# Patient Record
Sex: FEMALE | Race: WHITE | NOT HISPANIC OR LATINO | URBAN - METROPOLITAN AREA
[De-identification: names, ages, dates, MRNs, and addresses within clinical notes are randomized per-mention and may not be internally consistent; named-entity substitution may affect disease eponyms.]

---

## 2018-04-24 ENCOUNTER — OFFICE VISIT (OUTPATIENT)
Dept: PSYCHIATRY | Facility: CLINIC | Age: 28
End: 2018-04-24
Payer: OTHER GOVERNMENT

## 2018-04-24 VITALS
HEART RATE: 85 BPM | WEIGHT: 145.81 LBS | HEIGHT: 69 IN | DIASTOLIC BLOOD PRESSURE: 66 MMHG | SYSTOLIC BLOOD PRESSURE: 116 MMHG | BODY MASS INDEX: 21.6 KG/M2

## 2018-04-24 DIAGNOSIS — F60.3 BORDERLINE PERSONALITY DISORDER IN ADULT: ICD-10-CM

## 2018-04-24 DIAGNOSIS — F41.1 GAD (GENERALIZED ANXIETY DISORDER): ICD-10-CM

## 2018-04-24 DIAGNOSIS — F40.10 SOCIAL ANXIETY DISORDER: ICD-10-CM

## 2018-04-24 DIAGNOSIS — F41.0 PANIC DISORDER: Primary | ICD-10-CM

## 2018-04-24 DIAGNOSIS — F43.10 PTSD (POST-TRAUMATIC STRESS DISORDER): ICD-10-CM

## 2018-04-24 PROCEDURE — 99203 OFFICE O/P NEW LOW 30 MIN: CPT | Mod: S$PBB,,, | Performed by: PSYCHIATRY & NEUROLOGY

## 2018-04-24 PROCEDURE — 99202 OFFICE O/P NEW SF 15 MIN: CPT | Mod: PBBFAC | Performed by: PSYCHIATRY & NEUROLOGY

## 2018-04-24 PROCEDURE — 99999 PR PBB SHADOW E&M-NEW PATIENT-LVL II: CPT | Mod: PBBFAC,,, | Performed by: PSYCHIATRY & NEUROLOGY

## 2018-04-24 RX ORDER — CLONAZEPAM 1 MG/1
1 TABLET ORAL DAILY PRN
Qty: 30 TABLET | Refills: 0 | Status: SHIPPED | OUTPATIENT
Start: 2018-04-24 | End: 2018-06-06 | Stop reason: SDUPTHER
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
## 2018-06-06 ENCOUNTER — OFFICE VISIT (OUTPATIENT)
Dept: PSYCHIATRY | Facility: CLINIC | Age: 28
End: 2018-06-06
Payer: OTHER GOVERNMENT

## 2018-06-06 VITALS
DIASTOLIC BLOOD PRESSURE: 61 MMHG | HEART RATE: 78 BPM | SYSTOLIC BLOOD PRESSURE: 114 MMHG | BODY MASS INDEX: 21.8 KG/M2 | WEIGHT: 147.19 LBS | HEIGHT: 69 IN

## 2018-06-06 DIAGNOSIS — F33.41 RECURRENT MAJOR DEPRESSIVE DISORDER, IN PARTIAL REMISSION: ICD-10-CM

## 2018-06-06 DIAGNOSIS — F50.81 BINGE-EATING DISORDER, MODERATE: Primary | ICD-10-CM

## 2018-06-06 DIAGNOSIS — F41.0 PANIC DISORDER: ICD-10-CM

## 2018-06-06 PROBLEM — F50.811 BINGE-EATING DISORDER, MODERATE: Status: ACTIVE | Noted: 2018-06-06

## 2018-06-06 PROCEDURE — 99213 OFFICE O/P EST LOW 20 MIN: CPT | Mod: S$PBB,,, | Performed by: PSYCHIATRY & NEUROLOGY

## 2018-06-06 PROCEDURE — 99213 OFFICE O/P EST LOW 20 MIN: CPT | Mod: PBBFAC | Performed by: PSYCHIATRY & NEUROLOGY

## 2018-06-06 PROCEDURE — 99999 PR PBB SHADOW E&M-EST. PATIENT-LVL III: CPT | Mod: PBBFAC,,, | Performed by: PSYCHIATRY & NEUROLOGY

## 2018-06-06 RX ORDER — LISDEXAMFETAMINE DIMESYLATE 30 MG/1
30 CAPSULE ORAL EVERY MORNING
Qty: 30 CAPSULE | Refills: 0 | Status: SHIPPED | OUTPATIENT
Start: 2018-08-06

## 2018-06-06 RX ORDER — BUPROPION HYDROCHLORIDE 300 MG/1
300 TABLET ORAL DAILY
Qty: 30 TABLET | Refills: 3 | Status: SHIPPED | OUTPATIENT
Start: 2018-06-06 | End: 2019-06-06

## 2018-06-06 RX ORDER — LISDEXAMFETAMINE DIMESYLATE 30 MG/1
30 CAPSULE ORAL EVERY MORNING
Qty: 30 CAPSULE | Refills: 0 | Status: SHIPPED | OUTPATIENT
Start: 2018-07-06 | End: 2018-06-06 | Stop reason: SDUPTHER

## 2018-06-06 RX ORDER — CLONAZEPAM 1 MG/1
1 TABLET ORAL DAILY PRN
Qty: 30 TABLET | Refills: 0 | Status: SHIPPED | OUTPATIENT
Start: 2018-06-06 | End: 2019-06-06

## 2018-06-06 RX ORDER — LISDEXAMFETAMINE DIMESYLATE 30 MG/1
30 CAPSULE ORAL EVERY MORNING
Qty: 30 CAPSULE | Refills: 0 | Status: SHIPPED | OUTPATIENT
Start: 2018-06-06 | End: 2018-06-06 | Stop reason: SDUPTHER

## 2018-06-06 NOTE — PROGRESS NOTES
06/06/2018  10:34 AM  iVdya Bah  [id number]          Psychiatry Clinic Note    SUBJECTIVE  Vidya Bah is a 28 y.o. old female who presents to clinic today for follow up of Depression, Adjustment disorder with anxiety.  She reports that since the last visit she has felt better with her mood now that she has seen acceptance from her significant other's family. She recently returned from their trip, and she reports that it went well. She reports that she is sleeping well and overall feels better. She mentions today that she is very concerned about her increases in binge-eating. She reports that she often eats in secret, eats until uncomfortably full, eats more than other people in the same situation, and she feels a complete loss of control when presented with food. She inquires about contrave to address her symptoms. After discussing at length, she does have both a component of craving large amounts of food, but more worrisome is her lack of control during these episodes. She has ahistory of purging behavior in the past, and she is worried that she may return to doing so if she can't get her symptoms under control. She is significantly distressed by her symptoms. Discussed trial of vyvanse for binge eating disorder-moderate, as this is approximately 5 or more times per week and increasing. Given that she has suffered with low-energy for years, the stimulant medication may help to bolster her mood at the same time. Contrave may be an option in the future if she fails trial of vyvanse; however given that she has been stable on her current dose of wellbutrin, she may benefit further from addition of naltrexone to her wellbutrin regimen. She denies SI. She reports that she and her boyfriend are moving later this month, and she is on a waitlist to see a psychiatrist up in [facility].     PSYCHIATRIC ROS  Denies: delusions, paranoia,  periods of persistently elevated/expansive or irritable mood and/or  "increased goal directed behavior.    Issues/problems with:   Sleep no  Appetite changes yes  Low energy yes  Poor concentration no  Psychomotor agitation no  Suicidal ideation no  Depressed mood no  Anhedonia no  Anxiety no  Worry yes, paticularly about binge episodes   Fear no    CURRENT HOME PSYCHIATRIC MEDICATIONS: Wellbutrin XL 300mg Daily. Klonopin 1mg Daily PRN severe anxiety, patient still has several pills left from previous visit.    Patient reports that She is tolerating medications as prescribed without any adverse side effects.         MEDICAL ROS  General ROS: negative for - chills or fever  ENT ROS: negative for - headaches or visual changes  Cardiovascular ROS: negative for - chest pain or palpitations  Respiratory ROS: negative for - cough or shortness of breath  Gastrointestinal ROS: negative for - nausea/vomiting  Neurological ROS: negative for - confusion or dizziness  Dermatological ROS: negative for rash and skin lesion changes  Endocrine ROS: negative for - mood swings or temperature intolerance      OBJECTIVE    Vitals:    06/06/18 0942   BP: 114/61   Pulse: 78       MENTAL STATUS EXAM  Appearance: no apparent distress, casually dressed, more relaxed appearing today  Behavior/Cooperation/Attitude: calm, cooperative, engaged  Speech: conversational rate/tone/volume  Mood: "better"  Affect: appropriate, but still mildly blunted  Thought Process: linear  Thought Content: denies SI/HI/AVH  Sensorium: awake/alert  Orientation: oriented to person/place/day of week/situation  Attention/Memory: recent and remote intact  Calculation/Concentration: intact to conversation  Fund of Knowledge: intact to conversation  Abstraction: intact to conversation  Insight: fair  Judgment: appropriate for setting  Impulse Control: fair, except with binge eating episodes  Reliability: fair      STATUS/PROGRESS Based on the examination today, the patient's problem(s) is/are stable. New problems have presented today. " Co-morbidities are not complicating management of the primary condition. There are not active rule-out diagnoses for this patient at this time.       ASSESSMENT AND PLAN  PTSD              - r/o OCD, patient's obsessions are all related to safety and are likely closely related to her PTSD  Borderline Personality Disorder  MDD  Binge Eating Disorder, Moderate  Adjustment disorder with associated anxiety/panic     -Continue Wellbutrin [id number] daily  - Resumed Clonazepam 1mg Daily PRN severe anxiety/panic              - reviewed risks/side effects of benzodiazepines up to and including seizure risk. Reviewed that wellbutrin and benzodiazepines together could raise risk of seizure.s She has tolerated this combination in the past, and she agrees that the benefits outweigh the risks at this time.               - Patient plans to re-taper use after moving is complete in June.  - Begin trial of vyvanse for worsening binge-eating disorder, currently moderate.   - Prescribed vyvanse 30mg Daily initially. Discussed that if she continues on this medication, it may need to be increased to 50 or 70mg to address binge symptoms fully.  Discussed below risks of stimulant with patient   Decreased appetite. People seem to be less hungry during the middle of the day, but they are often hungry by dinnertime as the medication wears off.   Sleep problems. If a person cannot fall asleep, the doctor may prescribe a lower dose. The doctor might also suggest that the medication is taken earlier in the day, or stop the afternoon or evening dose.    Stomach aches and headaches.   Tics. A few people develop sudden, repetitive movements or sounds called tics. These tics may or may not be noticeable. Changing the medication dosage may make tics go away. Some people also may appear to have a personality change, such as appearing flat or without emotion. Talk with your doctor if you see any of these side effects.  · Stimulant medications may  lead to drug abuse or dependence, but the risk is highest for those patient taking this class of medication who do not have ADHD. Research shows that teens with ADHD who took stimulant medications were less likely to abuse drugs than those who did not take stimulant medications. Proper diagnosis is the key to minimizing this risk  · Discussed with female patients that they will need to be off of stimulant if trying to become pregnant or become pregnant         Discussed diagnosis, risks and benefits of proposed treatment above vs alternative treatments vs no treatment, and potential side effects of these treatments. The patient expresses understanding of the above and displays the capacity to agree with this treatment given said understanding. Patient also agrees that, currently, the benefits outweigh the risks and would like to pursue treatment at this time.     Return to clinic as needed. Patient will be moving later this month to [facility]    Mervin Mauricio MD  PGY III

## 2025-07-22 ENCOUNTER — TELEPHONE (OUTPATIENT)
Dept: OBSTETRICS AND GYNECOLOGY | Facility: CLINIC | Age: 35
End: 2025-07-22